# Patient Record
Sex: MALE | HISPANIC OR LATINO | Employment: FULL TIME | URBAN - METROPOLITAN AREA
[De-identification: names, ages, dates, MRNs, and addresses within clinical notes are randomized per-mention and may not be internally consistent; named-entity substitution may affect disease eponyms.]

---

## 2023-07-02 ENCOUNTER — HOSPITAL ENCOUNTER (EMERGENCY)
Facility: CLINIC | Age: 43
Discharge: HOME | End: 2023-07-02
Payer: COMMERCIAL

## 2023-07-02 ENCOUNTER — APPOINTMENT (OUTPATIENT)
Dept: CT IMAGING | Facility: CLINIC | Age: 43
End: 2023-07-02
Attending: EMERGENCY MEDICINE
Payer: COMMERCIAL

## 2023-07-02 ENCOUNTER — APPOINTMENT (OUTPATIENT)
Dept: CT IMAGING | Facility: CLINIC | Age: 43
End: 2023-07-02
Payer: COMMERCIAL

## 2023-07-02 VITALS
WEIGHT: 192.8 LBS | HEART RATE: 91 BPM | SYSTOLIC BLOOD PRESSURE: 129 MMHG | DIASTOLIC BLOOD PRESSURE: 81 MMHG | RESPIRATION RATE: 20 BRPM | OXYGEN SATURATION: 99 % | TEMPERATURE: 98 F

## 2023-07-02 DIAGNOSIS — S02.92XA MULTIPLE CLOSED FRACTURES OF FACIAL BONE, INITIAL ENCOUNTER (H): Primary | ICD-10-CM

## 2023-07-02 DIAGNOSIS — S01.81XA FACIAL LACERATION, INITIAL ENCOUNTER: ICD-10-CM

## 2023-07-02 DIAGNOSIS — S09.90XA HEAD INJURY, INITIAL ENCOUNTER: ICD-10-CM
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
## 2023-07-02 LAB
ANION GAP SERPL CALCULATED.3IONS-SCNC: 12 MMOL/L (ref 7–15)
APTT PPP: 26 SECONDS (ref 22–38)
BASOPHILS # BLD AUTO: 0 10E3/UL (ref 0–0.2)
BASOPHILS NFR BLD AUTO: 0 %
BUN SERPL-MCNC: 7.8 MG/DL (ref 6–20)
CALCIUM SERPL-MCNC: 9.9 MG/DL (ref 8.6–10)
CHLORIDE SERPL-SCNC: 102 MMOL/L (ref 98–107)
CREAT SERPL-MCNC: 0.78 MG/DL (ref 0.67–1.17)
DEPRECATED HCO3 PLAS-SCNC: 23 MMOL/L (ref 22–29)
EOSINOPHIL # BLD AUTO: 0 10E3/UL (ref 0–0.7)
EOSINOPHIL NFR BLD AUTO: 0 %
ERYTHROCYTE [DISTWIDTH] IN BLOOD BY AUTOMATED COUNT: 12.8 % (ref 10–15)
GFR SERPL CREATININE-BSD FRML MDRD: >90 ML/MIN/1.73M2
GLUCOSE SERPL-MCNC: 122 MG/DL (ref 70–99)
HCT VFR BLD AUTO: 43.5 % (ref 40–53)
HGB BLD-MCNC: 15.4 G/DL (ref 13.3–17.7)
IMM GRANULOCYTES # BLD: 0 10E3/UL
IMM GRANULOCYTES NFR BLD: 0 %
INR PPP: 1.12 (ref 0.85–1.15)
LYMPHOCYTES # BLD AUTO: 1.2 10E3/UL (ref 0.8–5.3)
LYMPHOCYTES NFR BLD AUTO: 13 %
MCH RBC QN AUTO: 32.8 PG (ref 26.5–33)
MCHC RBC AUTO-ENTMCNC: 35.4 G/DL (ref 31.5–36.5)
MCV RBC AUTO: 93 FL (ref 78–100)
MONOCYTES # BLD AUTO: 0.5 10E3/UL (ref 0–1.3)
MONOCYTES NFR BLD AUTO: 6 %
NEUTROPHILS # BLD AUTO: 7.3 10E3/UL (ref 1.6–8.3)
NEUTROPHILS NFR BLD AUTO: 81 %
NRBC # BLD AUTO: 0 10E3/UL
NRBC BLD AUTO-RTO: 0 /100
PLATELET # BLD AUTO: 226 10E3/UL (ref 150–450)
POTASSIUM SERPL-SCNC: 3.4 MMOL/L (ref 3.4–5.3)
RBC # BLD AUTO: 4.7 10E6/UL (ref 4.4–5.9)
SODIUM SERPL-SCNC: 137 MMOL/L (ref 136–145)
WBC # BLD AUTO: 9 10E3/UL (ref 4–11)

## 2023-07-02 PROCEDURE — 12011 RPR F/E/E/N/L/M 2.5 CM/<: CPT

## 2023-07-02 PROCEDURE — 250N000009 HC RX 250

## 2023-07-02 PROCEDURE — 36415 COLL VENOUS BLD VENIPUNCTURE: CPT | Performed by: EMERGENCY MEDICINE

## 2023-07-02 PROCEDURE — 96366 THER/PROPH/DIAG IV INF ADDON: CPT | Mod: 59

## 2023-07-02 PROCEDURE — 90471 IMMUNIZATION ADMIN: CPT

## 2023-07-02 PROCEDURE — 85025 COMPLETE CBC W/AUTO DIFF WBC: CPT | Performed by: EMERGENCY MEDICINE

## 2023-07-02 PROCEDURE — 99285 EMERGENCY DEPT VISIT HI MDM: CPT | Mod: 25

## 2023-07-02 PROCEDURE — 70450 CT HEAD/BRAIN W/O DYE: CPT

## 2023-07-02 PROCEDURE — 85610 PROTHROMBIN TIME: CPT | Performed by: EMERGENCY MEDICINE

## 2023-07-02 PROCEDURE — 80048 BASIC METABOLIC PNL TOTAL CA: CPT | Performed by: EMERGENCY MEDICINE

## 2023-07-02 PROCEDURE — 70486 CT MAXILLOFACIAL W/O DYE: CPT

## 2023-07-02 PROCEDURE — 85730 THROMBOPLASTIN TIME PARTIAL: CPT | Performed by: EMERGENCY MEDICINE

## 2023-07-02 PROCEDURE — 90715 TDAP VACCINE 7 YRS/> IM: CPT

## 2023-07-02 PROCEDURE — 250N000011 HC RX IP 250 OP 636

## 2023-07-02 PROCEDURE — 258N000003 HC RX IP 258 OP 636: Performed by: EMERGENCY MEDICINE

## 2023-07-02 PROCEDURE — 96365 THER/PROPH/DIAG IV INF INIT: CPT | Mod: 59

## 2023-07-02 PROCEDURE — 250N000011 HC RX IP 250 OP 636: Performed by: EMERGENCY MEDICINE

## 2023-07-02 RX ORDER — LIDOCAINE HYDROCHLORIDE AND EPINEPHRINE 10; 10 MG/ML; UG/ML
INJECTION, SOLUTION INFILTRATION; PERINEURAL
Status: DISCONTINUED
Start: 2023-07-02 | End: 2023-07-02 | Stop reason: HOSPADM

## 2023-07-02 RX ORDER — TETRACAINE HYDROCHLORIDE 5 MG/ML
2 SOLUTION OPHTHALMIC ONCE
Status: COMPLETED | OUTPATIENT
Start: 2023-07-02 | End: 2023-07-02

## 2023-07-02 RX ORDER — CEFAZOLIN SODIUM 1 G/3ML
1 INJECTION, POWDER, FOR SOLUTION INTRAMUSCULAR; INTRAVENOUS ONCE
Status: COMPLETED | OUTPATIENT
Start: 2023-07-02 | End: 2023-07-02

## 2023-07-02 RX ORDER — AZITHROMYCIN 250 MG/1
TABLET, FILM COATED ORAL
Qty: 6 TABLET | Refills: 0 | Status: SHIPPED | OUTPATIENT
Start: 2023-07-02

## 2023-07-02 RX ORDER — AZITHROMYCIN 250 MG/1
TABLET, FILM COATED ORAL
Qty: 6 TABLET | Refills: 0 | Status: SHIPPED | OUTPATIENT
Start: 2023-07-02 | End: 2023-07-02

## 2023-07-02 RX ADMIN — FLUORESCEIN SODIUM 1 STRIP: 1 STRIP OPHTHALMIC at 10:24

## 2023-07-02 RX ADMIN — TETRACAINE HYDROCHLORIDE 2 DROP: 5 SOLUTION OPHTHALMIC at 10:24

## 2023-07-02 RX ADMIN — Medication 3 ML: at 10:22

## 2023-07-02 RX ADMIN — CLOSTRIDIUM TETANI TOXOID ANTIGEN (FORMALDEHYDE INACTIVATED), CORYNEBACTERIUM DIPHTHERIAE TOXOID ANTIGEN (FORMALDEHYDE INACTIVATED), BORDETELLA PERTUSSIS TOXOID ANTIGEN (GLUTARALDEHYDE INACTIVATED), BORDETELLA PERTUSSIS FILAMENTOUS HEMAGGLUTININ ANTIGEN (FORMALDEHYDE INACTIVATED), BORDETELLA PERTUSSIS PERTACTIN ANTIGEN, AND BORDETELLA PERTUSSIS FIMBRIAE 2/3 ANTIGEN 0.5 ML: 5; 2; 2.5; 5; 3; 5 INJECTION, SUSPENSION INTRAMUSCULAR at 10:24

## 2023-07-02 RX ADMIN — CEFAZOLIN 1 G: 1 INJECTION, POWDER, FOR SOLUTION INTRAMUSCULAR; INTRAVENOUS at 11:57

## 2023-07-02 RX ADMIN — SODIUM CHLORIDE 1000 ML: 9 INJECTION, SOLUTION INTRAVENOUS at 11:56

## 2023-07-02 ASSESSMENT — ACTIVITIES OF DAILY LIVING (ADL)
ADLS_ACUITY_SCORE: 35
ADLS_ACUITY_SCORE: 35

## 2023-07-02 ASSESSMENT — VISUAL ACUITY
OU: 20/20
OD: 20/20
OS: 20/20

## 2023-07-02 NOTE — DISCHARGE INSTRUCTIONS
Were seen and evaluated here in the emergency department after a fall last night.  You were found to have multiple facial bone fractures.  You will need to follow-up with [facility] specialist Wednesday in clinic.  They would like you to take a prescription of azithromycin to prevent infection and you can pick this up at the [facility] in [place] on [address].  To the [facility] emergency department if you develop increased swelling around the eye, blurry vision, drainage from the ears or nose, severe headache, fever or any other acute concern.    Discharge Instructions  Head Injury    You have been seen today for a head injury. Your evaluation included a history and physical examination. You may have had a CT (CAT) scan performed, though most head injuries do not require a scan. Based on this evaluation, your provider today does not feel that your head injury is serious.    Generally, every Emergency Department visit should have a follow-up clinic visit with either a primary or a specialty clinic/provider. Please follow-up as instructed by your emergency provider today.  Return to the Emergency Department if:  You are confused or you are not acting right.  Your headache gets worse or you start to have a really bad headache even with your recommended treatment plan.  You vomit (throw up) more than once.  You have a seizure.  You have trouble walking.  You have weakness or paralysis (cannot move) in an arm or a leg.  You have blood or fluid coming from your ears or nose.  You have new symptoms or anything that worries you.    Sleeping:  It is okay for you to sleep, but someone should wake you up if instructed by your provider, and someone should check on you at your usual time to wake up.     Activity:  Do not drive for at least 24 hours.  Do not drive if you have dizzy spells or trouble concentrating, or remembering things.  Do not return to any contact sports until cleared by your regular provider.     MORE  INFORMATION:    Concussion:  A concussion is a minor head injury that may cause temporary problems with the way the brain works. Although concussions are important, they are generally not an emergency or a reason that a person needs to be hospitalized. Some concussion symptoms include confusion, amnesia (forgetful), nausea (sick to your stomach) and vomiting (throwing up), dizziness, fatigue, memory or concentration problems, irritability and sleep problems. For most people, concussions are mild and temporary but some will have more severe and persistent symptoms that require on-going care and treatment.  CT Scans: Your evaluation today may have included a CT scan (CAT scan) to look for things like bleeding or a skull fracture (broken bone).  CT scans involve radiation and too many CT scans can cause serious health problems like cancer, especially in children.  Because of this, your provider may not have ordered a CT scan today if they think you are at low risk for a serious or life threatening problem.    If you were given a prescription for medicine here today, be sure to read all of the information (including the package insert) that comes with your prescription.  This will include important information about the medicine, its side effects, and any warnings that you need to know about.  The pharmacist who fills the prescription can provide more information and answer questions you may have about the medicine.  If you have questions or concerns that the pharmacist cannot address, please call or return to the Emergency Department.     Remember that you can always come back to the Emergency Department if you are not able to see your regular provider in the amount of time listed above, if you get any new symptoms, or if there is anything that worries you.    Discharge Instructions  Laceration (Cut)    You were seen today for a laceration (cut).  Your provider examined your laceration for any problems such a buried  foreign body (like glass, a splinter, or gravel), or injury to blood vessels, tendons, and nerves.  Your provider may have also rinsed and/or scrubbed your laceration to help prevent an infection. It may not be possible to find all problems with your laceration on the first visit; occasionally foreign bodies or a tendon injury can go undetected.    Your laceration may have been closed in one of several ways:  No closure: many wounds will heal just fine without closure.  Stitches: regular stitches that require removal.  Staples: skin staples are often used in the scalp/head.  Wound adhesive (glue): skin glue can be used for certain lacerations and doesn t require removal.  Wound strips (aka Butterfly bandages or steri-strips): these are bandages that help to close a wound.  Absorbable stitches:  dissolving  stitches that go away on their own and usually don t require removal.    A small percentage of wounds will develop an infection regardless of how well the wound is cared for. Antibiotics are generally not indicated to prevent an infection so are only given for a small number of high-risk wounds. Some lacerations are too high risk to close, and are left open to heal because closure can increase the likelihood that an infection will develop.    Remember that all lacerations, no matter how expertly repaired, will cause scarring. We consider many factors, techniques, and materials, in our efforts to provide the best possible cosmetic outcome.    Generally, every Emergency Department visit should have a follow-up clinic visit with either a primary or a specialty clinic/provider. Please follow-up as instructed by your emergency provider today.     Return to the Emergency Department right away if:  You have more redness, swelling, pain, drainage (pus), a bad smell, or red streaking from your laceration as these symptoms could indicate an infection.  You have a fever of 100.4 F or more.  You have bleeding that you cannot  stop at home. If your cut starts to bleed, hold pressure on the bleeding area with a clean cloth or put pressure over the bandage.  If the bleeding does not stop after using constant pressure for 30 minutes, you should return to the Emergency Department for further treatment.  An area past the laceration is cool, pale, or blue compared with the other side, or has a slower return of color when squeezed.  Your dressing seems too tight or starts to get uncomfortable or painful. For children, signs of a problem might be irritability or restlessness.  You have loss of normal function or use of an area, such as being unable to straighten or bend a finger normally.  You have a numb area past the laceration.    Return to the Emergency Department or see your regular provider if:  The laceration starts to come open.   You have something coming out of the cut or a feeling that there is something in the laceration.  Your wound will not heal, or keeps breaking open. There can always be glass, wood, dirt or other things in any wound.  They will not always show up, even on x-rays.  If a wound does not heal, this may be why, and it is important to follow-up with your regular provider.    Home Care:  Take your dressing off in 12-24 hours, or as instructed by your provider, to check your laceration. Remove the dressing sooner if it seems too tight or painful, or if it is getting numb, tingly, or pale past the dressing.  Gently wash your laceration 1-2 times daily with clean water and mild soap. It is okay to shower or run clean water over the laceration, but do not let the laceration soak in water (no swimming).  If your laceration was closed with wound adhesive or strips: pat it dry and leave it open to the air. For all other repairs: after you wash your laceration, or at least 2 times a day, apply antibiotic ointment (such as Neosporin  or Bacitracin ) to the laceration, then cover it with a Band-Aid  or gauze.  Keep the laceration  clean. Wear gloves or other protective clothing if you are around dirt.    Follow-up for removal:  If your wound was closed with staples or regular stitches, they need to be removed according to the instructions and timeline specified by your provider today.  If your wound was closed with absorbable ( dissolving ) sutures, they should fall out, dissolve, or not be visible in about one week. If they are still visible, then they should be removed according to the instructions and timeline specified by your provider today.    Scars:  To help minimize scarring:  Wear sunscreen over the healed laceration when out in the sun.  Massage the area regularly once healed.  You may apply Vitamin E to the healed wound.  Wait. Scars improve in appearance over months and years.    If you were given a prescription for medicine here today, be sure to read all of the information (including the package insert) that comes with your prescription.  This will include important information about the medicine, its side effects, and any warnings that you need to know about.  The pharmacist who fills the prescription can provide more information and answer questions you may have about the medicine.  If you have questions or concerns that the pharmacist cannot address, please call or return to the Emergency Department.       Remember that you can always come back to the Emergency Department if you are not able to see your regular provider in the amount of time listed above, if you get any new symptoms, or if there is anything that worries you.

## 2023-07-02 NOTE — ED PROVIDER NOTES
History     Chief Complaint:  Laceration       The history is provided by the patient.      Justo Charles is a 42 year old male who presents with a laceration over his left eye. Per the patient, he was walking his friend home at 0140 last night when she slipped and fell. He tried to help her up but also slipped and her head hit his. He reports a laceration over his left eye with swelling and bruising as well as a potentially mild headache. He denies visual disturbance, eye pain, difficulty moving his eye, loss of consciousness, nausea, vomiting, unsteadiness, confusion or any other injuries. He denies being on blood thinners and states that he is otherwise healthy apart from an allergy to amoxicillin.       Independent Historian:   None - Patient Only    Review of External Notes:   The patient's last Tetanus was on 9/19/12.       Medications:    None    Past Medical History:    No past medical history reported.        Physical Exam     Patient Vitals for the past 24 hrs:   BP Temp Temp src Pulse Resp SpO2 Weight   07/02/23 1419 129/81 -- -- 91 -- 99 % --   07/02/23 1200 (!) 143/87 -- -- 93 -- 99 % --   07/02/23 1112 -- -- -- -- -- -- 87.5 kg (192 lb 12.8 oz)   07/02/23 0947 (!) 142/88 98  F (36.7  C) Temporal 98 20 99 % --        Physical Exam  General: Pleasant nontoxic appearing male.  HENT:   Ears: No hemotympanum.  Head: Facial trauma per below. No drainage from the nares.  Mouth/Throat: Oropharynx clear and moist.  Eyes: Extraocular movements intact. Ecchymosis and swelling to left eye per below. Intraocular pressure 24 in right eye and 23 in left eye. Visual acuity right eye and left eye 20/20. Wood lamp with Fluorescein stain negative daina sign and no corneal abrasion. Patient has subconjunctival hemorrhage to the left eye per photo below. No hyphema. No ocular proptosis.           CV: Regular rate and rhythm.   Resp: Lungs clear to auscultation bilaterally.   MSK: Normal range of  motion.  Skin: Warm and dry.  Neuro: Awake, alert, oriented x 3.  GCS 15.  Cranial nerves II through XII intact.  Normal and fluent speech.  Normal and equal strength in all extremities.  Psych: Normal mood and affect.    Emergency Department Course     Imaging:  CT Head w/o Contrast   Final Result   IMPRESSION:   1.  No acute intracranial hemorrhage.   2.  Left-sided orbital and facial fractures are again seen.      CT Facial Bones without Contrast   Final Result   IMPRESSION:    1.  Acute minimally displaced left orbital roof fracture with small volume overlying pneumocephalus.   2.  Acute comminuted and depressed fracture of the lateral left orbital wall with slight mass effect upon the lateral rectus.   3.  Unremarkable globes. No evidence of retrobulbar hematoma.   4.  Acute left zygomatic arch fractures, including a fracture that extends into the left temporomandibular joint.   5.  Acute nondisplaced fracture through the anterior left maxillary sinus wall. Mildly displaced posterior left maxillary sinus wall fracture.            Report per radiology   I reviewed the CT Head and note no acute head bleed..    Laboratory:  Labs Ordered and Resulted from Time of ED Arrival to Time of ED Departure   BASIC METABOLIC PANEL - Abnormal       Result Value    Sodium 137      Potassium 3.4      Chloride 102      Carbon Dioxide (CO2) 23      Anion Gap 12      Urea Nitrogen 7.8      Creatinine 0.78      Calcium 9.9      Glucose 122 (*)     GFR Estimate >90     INR - Normal    INR 1.12     PARTIAL THROMBOPLASTIN TIME - Normal    aPTT 26     CBC WITH PLATELETS AND DIFFERENTIAL    WBC Count 9.0      RBC Count 4.70      Hemoglobin 15.4      Hematocrit 43.5      MCV 93      MCH 32.8      MCHC 35.4      RDW 12.8      Platelet Count 226      % Neutrophils 81      % Lymphocytes 13      % Monocytes 6      % Eosinophils 0      % Basophils 0      % Immature Granulocytes 0      NRBCs per 100 WBC 0      Absolute Neutrophils 7.3       Absolute Lymphocytes 1.2      Absolute Monocytes 0.5      Absolute Eosinophils 0.0      Absolute Basophils 0.0      Absolute Immature Granulocytes 0.0      Absolute NRBCs 0.0          Procedures     Laceration Repair      Procedure: Laceration Repair    Indication: Laceration    Consent: Verbal    Location: Left Eye    Length: 2 cm    Preparation: Irrigation with Sterile Saline Pressure device utilized.    Anesthesia/Sedation: Topical -LET and Lidocaine with Epinephrine - 1%      Treatment/Exploration: Wound explored, no foreign bodies found     Closure: The wound was closed with one layer. Skin/superficial layer was closed with 4 x 5-0 Nylon using Interrupted sutures.     Patient Status: The patient tolerated the procedure well: Yes. There were no complications.      Emergency Department Course & Assessments:      Interventions:  Medications   Tdap (tetanus-diphtheria-acell pertussis) (ADACEL) injection 0.5 mL (0.5 mLs Intramuscular $Given 7/2/23 1024)   lido-EPINEPHrine-tetracaine (LET) topical gel GEL (3 mLs Topical $Given 7/2/23 1022)   tetracaine (PONTOCAINE) 0.5 % ophthalmic solution 2 drop (2 drops Left Eye $Given 7/2/23 1024)   fluorescein (FUL-SAWYER) ophthalmic strip 1 strip (1 strip Left Eye $Given 7/2/23 1024)   0.9% sodium chloride BOLUS (0 mLs Intravenous Stopped 7/2/23 1412)   ceFAZolin (ANCEF) 1 g vial to attach to  ml bag for ADULT or 50 ml bag for PEDS (0 g Intravenous Stopped 7/2/23 1412)        Assessments:  0955 I obtained history and examined the patient as noted above.  1015 Recheccked the patient and measured intraocular pressures.   1050 I performed a laceration repair as noted above.   1122 I rechecked the patient.   1310  I rechecked the patient.   1316 I rechecked the patient.   1404 I rechecked the patient and explained the plan and findings. He has no drainage from his nares or ears.     Independent Interpretation (X-rays, CTs, rhythm strip):  Multiple facial bone fractures seen on  CT.    Consultations/Discussion of Management or Tests:  1030 I spoke with Dr. Fink about staffing the patient.  1138 I spoke with Dr. Butts with OMFS who will speak with his attending and call back.   1217 I spoke with Dr. Butts with OMFS.  1316 I paged neurosurgery regarding the patient.   1323 I spoke with Linda Conteh NP of Neurosurgery.   1404 I spoke with Linda Conteh NP of Neurosurgery. She said he can discharge from home so long as he does not have any drainage out of his nose or ears.     Social Determinants of Health affecting care:   None    Disposition:  The patient was discharged to home.     Impression & Plan      Medical Decision Making:  Justo is a pleasant 42-year-old male who came into the ED today for evaluation of facial trauma after a fall last night per HPI above.  On arrival he has normal vital signs and is GCS 15.  Patient had ecchymosis and swelling to the left eye per exam photo as above.  There was associated bony tenderness.  I pursued CT of the facial bones which showed multiple fractures per below:  1. Acute minimally displaced left orbital roof fracture with small volume overlying pneumocephalus.  2.  Acute comminuted and depressed fracture of the lateral left orbital wall with slight mass effect upon the lateral rectus.  3.  Acute left zygomatic arch fractures, including a fracture that extends into the left temporomandibular joint.  4.  Acute nondisplaced fracture through the anterior left maxillary sinus wall. Mildly displaced posterior left maxillary sinus wall fracture.     Patient's EOMs were intact.  He had no traumatic hyphema.  He had a subconjunctival hemorrhage, but no retrobulbar hematoma.  Visual acuity normal.  No ocular proptosis and normal pressures in both eyes.  On Woods lamp with fluorescein stain, no corneal abrasion and negative Jose M sign.  I discussed all findings with OMFS and they felt he was appropriate for discharge home.  They will  see him in their clinic on Wednesday and made an appointment for him for recheck.  We provided IV Ancef here for infection prophylaxis and OMFS wanted him placed on a Z-Jing as an outpatient.  I also obtained CT head, and this was negative for acute bleed.  Neurosurgery was fine with dispo home since the patient had no fluid leakage from his ears or nares.  Return precautions discussed and provided in writing.  C-spine cleared clinically.      Also, patient had a small 2 cm laceration per photo above.  His tetanus was updated here today.  Wound was copiously irrigated.  I did place 4 sutures and he understands he will need to get sutures removed in 5 days with a primary care physician or at urgent care.    No other acute injuries in this incident.  I did staff this patient with Dr. Fink due to the need for OMFS consultation.  Please see separate APC supervisory note.    Diagnosis:    ICD-10-CM    1. Multiple closed fractures of facial bone, initial encounter (H)  S02.92XA       2. Head injury, initial encounter  S09.90XA       3. Fall, initial encounter  W19.XXXA       4. Facial laceration, initial encounter  S01.81XA Primary Care Referral           Discharge Medications:  Discharge Medication List as of 7/2/2023  2:13 PM      START taking these medications    Details   azithromycin (ZITHROMAX Z-JING) 250 MG tablet Two tablets on the first day, then one tablet daily for the next 4 days, Disp-6 tablet, R-0, E-Prescribe                Scribe Disclosure:  I, Gian Strong, am serving as a scribe at 2:10 PM on 7/2/2023 to document services personally performed by Kajal Benitez PA-C based on my observations and the provider's statements to me.     7/2/2023   Kajal Benitez PA-C Dewing, Jennifer C, PA-C  07/02/23 1941

## 2023-07-02 NOTE — PROGRESS NOTES
Contacted by [facility] ED regarding 42-year-old male who presented to the ED with a laceration over his left eye.  He was apparently walking his friend home early this morning when he slipped and fell.  They apparently hit heads.  Per ED, he is neurologically intact.    EXAM: CT FACIAL BONES WITHOUT CONTRAST  LOCATION: [facility]  DATE: 07/02/2023     INDICATION: Trauma to left eye with tenderness over orbital bones, EOMs intact. Pain.  COMPARISON: None.  TECHNIQUE: Routine CT Maxillofacial without IV contrast. Multiplanar reformats. Dose reduction techniques were used.      FINDINGS:  The walls of the frontal, ethmoid, sphenoid and right maxillary sinus chambers appear intact. There is an acute mildly displaced fracture through the posterior wall of the left maxillary sinus (image 76 of series 2, for example). Nondisplaced fracture   through the anterior wall of the left maxillary sinus (sagittal image 51). Layering fluid or debris is demonstrated within them. There is mild-to-moderate ethmoid mucosal thickening. Small volume of aerated secretions in the ethmoid air cells.     The mastoid air cells and middle ear cavities are well pneumatized and aerated with appropriate positioning of the ossicles.     There is a minimally displaced fracture of the left orbital roof (image 25 of series 4), with punctate volume of associated overlying pneumocephalus. There is a mildly depressed and angulated fracture of the lateral left orbital wall causing subtle   effacement of the lateral rectus. Subtle contour irregularity along the left orbital floor, without definite displaced orbital floor fracture. The globes appear intact. No evidence for space-occupying retrobulbar hematoma.     The nasal bones are free of acute displaced fracture. The pterygoid plates and processes of the sphenoid bones are unremarkable. There are two nondisplaced left zygomatic arch fractures, the more posterior fracture  extends into the temporomandibular   joint.     The temporomandibular joints are symmetrically aligned. There is no evidence of an acute displaced mandibular fracture.     As above, there is punctate pneumocephalus along the left anterior cranial fossa floor (sagittal image 5). Limited images of the intracranial compartment demonstrate no evidence of acute abnormality.     There is left facial soft tissue injury with evidence of edema and laceration.     The visualized pharyngeal contours are unremarkable. Limited images of the upper cervical spine demonstrate no acute abnormality.                                                                       IMPRESSION:   1.  Acute minimally displaced left orbital roof fracture with small volume overlying pneumocephalus.  2.  Acute comminuted and depressed fracture of the lateral left orbital wall with slight mass effect upon the lateral rectus.  3.  Unremarkable globes. No evidence of retrobulbar hematoma.  4.  Acute left zygomatic arch fractures, including a fracture that extends into the left temporomandibular joint.  5.  Acute nondisplaced fracture through the anterior left maxillary sinus wall. Mildly displaced posterior left maxillary sinus wall fracture.    EXAM: CT HEAD W/O CONTRAST  LOCATION: [facility]  DATE: 7/2/2023     INDICATION: Head injury.  COMPARISON: CT maxillofacial 07/02/2023.  TECHNIQUE: Routine CT Head without IV contrast. Multiplanar reformats. Dose reduction techniques were used.     FINDINGS:  INTRACRANIAL CONTENTS: No intracranial hemorrhage, extraaxial collection, or mass effect.  No CT evidence of acute infarct. Normal parenchymal attenuation. Normal ventricles and sulci.      VISUALIZED ORBITS/SINUSES/MASTOIDS: Left-sided periorbital soft tissue swelling. There is an acute fracture of the anterior and posterior aspects of the left sacral Modic aortic arch. There is an acute fracture of the lateral left orbital wall. There  is   acute fracture of the left lateral orbital wall and posterior left orbital floor. Acute fracture of the left superior orbital roof is better visualized on previous CT imaging of the maxillofacial region. Minimal mucosal thickening scattered about the   paranasal sinuses. No middle ear or mastoid effusion.                                                                      IMPRESSION:  1.  No acute intracranial hemorrhage.  2.  Left-sided orbital and facial fractures are again seen.    Discussed with Dr. Chaves    Recommendations:  -Ensure no CSF leak  -If no leak, ok to discharge with follow up per OMFS as previously recommended    Linda Conteh [facility]  [address] [place] [id number]  Tel [phone]  Fax [phone]  Text page via [facility] Paging/Directory

## 2023-07-02 NOTE — ED TRIAGE NOTES
Dancing last night, dance partner fell and patient tried to catch her, was pulled down. Laceration and swelling to left eye. Denies LOC
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
## 2023-07-03 ENCOUNTER — OFFICE VISIT (OUTPATIENT)
Dept: FAMILY MEDICINE | Facility: CLINIC | Age: 43
End: 2023-07-03
Payer: COMMERCIAL

## 2023-07-03 VITALS
RESPIRATION RATE: 16 BRPM | TEMPERATURE: 98.1 F | DIASTOLIC BLOOD PRESSURE: 73 MMHG | OXYGEN SATURATION: 98 % | BODY MASS INDEX: 29.55 KG/M2 | WEIGHT: 195 LBS | SYSTOLIC BLOOD PRESSURE: 112 MMHG | HEART RATE: 68 BPM | HEIGHT: 68 IN

## 2023-07-03 DIAGNOSIS — S02.92XA CLOSED FRACTURE OF FACIAL BONE, UNSPECIFIED FACIAL BONE, INITIAL ENCOUNTER (H): Primary | ICD-10-CM

## 2023-07-03 PROCEDURE — 99204 OFFICE O/P NEW MOD 45 MIN: CPT | Performed by: FAMILY MEDICINE

## 2023-07-03 RX ORDER — HYDROCODONE BITARTRATE AND ACETAMINOPHEN 5; 325 MG/1; MG/1
1 TABLET ORAL EVERY 6 HOURS PRN
Qty: 18 TABLET | Refills: 0 | Status: SHIPPED | OUTPATIENT
Start: 2023-07-03 | End: 2023-07-06

## 2023-07-03 ASSESSMENT — ENCOUNTER SYMPTOMS
HEADACHES: 0
DIZZINESS: 0

## 2023-07-03 NOTE — LETTER
July 3, 2023      Justo Charles  [address]   [place] [address]        To Whom It May Concern:    Justo Charles was seen in our clinic. He may return to work with the following: No weight or pressure onto left face for 4-6 weeks; okay to use welding helmet. Otherwise, no restrictions.      Sincerely,        Saw Florentino MD
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
